# Patient Record
Sex: MALE | Race: OTHER | HISPANIC OR LATINO | Employment: UNEMPLOYED | URBAN - METROPOLITAN AREA
[De-identification: names, ages, dates, MRNs, and addresses within clinical notes are randomized per-mention and may not be internally consistent; named-entity substitution may affect disease eponyms.]

---

## 2019-12-05 DIAGNOSIS — G54.0 TOS (THORACIC OUTLET SYNDROME): Primary | ICD-10-CM

## 2019-12-16 ENCOUNTER — OFFICE VISIT (OUTPATIENT)
Dept: VASCULAR SURGERY | Facility: CLINIC | Age: 34
End: 2019-12-16
Attending: SURGERY
Payer: COMMERCIAL

## 2019-12-16 ENCOUNTER — HOSPITAL ENCOUNTER (OUTPATIENT)
Dept: RADIOLOGY | Facility: HOSPITAL | Age: 34
Discharge: HOME | End: 2019-12-16
Attending: SURGERY
Payer: COMMERCIAL

## 2019-12-16 VITALS
HEART RATE: 84 BPM | RESPIRATION RATE: 20 BRPM | TEMPERATURE: 100 F | DIASTOLIC BLOOD PRESSURE: 85 MMHG | BODY MASS INDEX: 21.29 KG/M2 | HEIGHT: 66 IN | WEIGHT: 132.5 LBS | SYSTOLIC BLOOD PRESSURE: 126 MMHG

## 2019-12-16 DIAGNOSIS — R07.89 PAIN, CHEST WALL: Chronic | ICD-10-CM

## 2019-12-16 DIAGNOSIS — M79.602 LEFT ARM PAIN: ICD-10-CM

## 2019-12-16 DIAGNOSIS — G54.0 TOS (THORACIC OUTLET SYNDROME): ICD-10-CM

## 2019-12-16 PROCEDURE — 99999 PR PBB SHADOW E&M-EST. PATIENT-LVL III: ICD-10-PCS | Mod: PBBFAC,,, | Performed by: SURGERY

## 2019-12-16 PROCEDURE — 99204 OFFICE O/P NEW MOD 45 MIN: CPT | Mod: S$GLB,,, | Performed by: SURGERY

## 2019-12-16 PROCEDURE — 71046 XR CHEST PA AND LATERAL: ICD-10-PCS | Mod: 26,,, | Performed by: RADIOLOGY

## 2019-12-16 PROCEDURE — 99999 PR PBB SHADOW E&M-EST. PATIENT-LVL III: CPT | Mod: PBBFAC,,, | Performed by: SURGERY

## 2019-12-16 PROCEDURE — 99204 PR OFFICE/OUTPT VISIT, NEW, LEVL IV, 45-59 MIN: ICD-10-PCS | Mod: S$GLB,,, | Performed by: SURGERY

## 2019-12-16 PROCEDURE — 71046 X-RAY EXAM CHEST 2 VIEWS: CPT | Mod: 26,,, | Performed by: RADIOLOGY

## 2019-12-16 PROCEDURE — 99213 OFFICE O/P EST LOW 20 MIN: CPT | Mod: PBBFAC,25 | Performed by: SURGERY

## 2019-12-16 PROCEDURE — 71046 X-RAY EXAM CHEST 2 VIEWS: CPT | Mod: TC

## 2019-12-16 NOTE — LETTER
December 17, 2019      Jeferson Larson MD  [address]  [facility] [address]           [facility] - Vascular Surgery  [address]  [facility] [address]  Phone: [phone]  Fax: [phone]          Patient: Mahin Blue   MR Number: [id number]   YOB: 1985   Date of Visit: 12/16/2019       Dear Dr. Jeferson Larson:    Thank you for referring Mahin Blue to me for evaluation. Attached you will find relevant portions of my assessment and plan of care.    If you have questions, please do not hesitate to call me. I look forward to following Mahin Blue along with you.    Sincerely,    NERIS Castellanos II, MD    Enclosure  CC:  No Recipients    If you would like to receive this communication electronically, please contact [email or web address] or [phone] to request more information on [employer] Link access.    For providers and/or their staff who would like to refer a patient to [facility], please contact us through our one-stop-shop provider referral line, [facility], at [phone].    If you feel you have received this communication in error or would no longer like to receive these types of communications, please e-mail [email or web address]

## 2019-12-17 PROBLEM — R07.89 PAIN, CHEST WALL: Chronic | Status: ACTIVE | Noted: 2019-12-17

## 2019-12-17 PROBLEM — M79.602 LEFT ARM PAIN: Status: ACTIVE | Noted: 2019-12-17

## 2019-12-18 NOTE — PROGRESS NOTES
VASCULAR SURGERY NOTE    Patient ID: Mahin Blue is a 34 y.o. male.    I. HISTORY     Chief Complaint: evaluation for neurogenic TOS    HPI: Mahin Blue is a 34 y.o. male who is here today for new patient initial appointment.  The patient was seen with his  who translated for him with his consent.  He sustained an injury to his left chest in 2006 when a large saw that he was working with on the job kicked back and cut him across his chest.  He was taken to the hospital where the large laceration was repaired with sutures. The laceration eventually healed.  Initially he did okay and made a full recovery.  However over past several years he has had persistent pain and hypersensitivity in his left upper chest inferior and lateral to his scar. He also complains of tightness in the left upper chest.  He also has numbness, pain, and paresthesias radiating to his left arm and hand.  The symptoms worsen with left arm activity and left shoulder abduction.  He denies any other medical problems.    He has had extensive workup for these symptoms. He has participated in extensive physical therapy to help loosen the muscles of his left chest without improvement in symptoms.  He was seen by Dr. Matthew Patterson in pain management in September and again in November of this year.  He had trigger point injection in his left chest at the area of maximum tenderness. This did not result in any improvement of his symptoms.  He was referred to Dr. Jeferson Vieira with sports medicine.  He had an MRI of his left shoulder which was normal. He also had an EMG of his left arm and shoulder which was normal. He was referred to me  for evaluation for possible thoracic outlet syndrome.    No past medical history on file.     Past Surgical History:   Procedure Laterality Date    REPAIR OF LACERATION N/A     chest trauma 2006       Social History     Tobacco Use   Smoking Status Never Smoker        Review of Systems   Constitution:  Negative for chills and fever.   HENT: Negative for ear pain and hoarse voice.    Eyes: Negative for discharge and pain.   Cardiovascular: Negative for chest pain and palpitations.   Respiratory: Negative for cough, hemoptysis and shortness of breath.    Endocrine: Negative for polydipsia and polyuria.   Skin: Negative for dry skin and rash.   Musculoskeletal: Negative for back pain and neck pain.   Gastrointestinal: Negative for abdominal pain, diarrhea, nausea and vomiting.   Genitourinary: Negative for dysuria and hematuria.   Neurological: Negative for dizziness, loss of balance and paresthesias.         II. PHYSICAL EXAM     Physical Exam  GEN: Alert/oriented  HEENT: atraumatic, normocephalic  CHEST: normal respiratory effort, symmetrical chest rise  CARD: RRR  ABD: soft, NTND  EXT: Warm, no cyanosis/edema  VASC: 2+ radial pulses bilaterally      Arm Abduction:  Left: + local and peripheral symptoms  Right: negative    Scalene Triangle palpation:  Left:  Negative  Right:  Negative    Pec Minor Insertion palpation:  Left:  Positive local and peripheral symptoms  Right:  Negative    EAST:  Left:  Positive at  1 min 15 sec  Right:  Negative    ULTT:  Left:  Negative  Right:  Negative    QuickDash Score: 57      III. ASSESSMENT & PLAN (MEDICAL DECISION MAKING)     1. Pain, chest wall    2. Left arm pain        Imaging Results:  CXR:  normal    Assessment/Diagnosis and Plan:  34 y.o. 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  We will refer him to Dr. Nichole Healy for left pec minor block for diagnosis of NPMS. Results of block will help predict outcome with pec minor release and strengthen the diagnosis of NPMS.    -ambulatory referral to Dr. Healy for left pec minor block  -RTC on the week following the block to discuss possible surgery    NERIS Castellanos II, MD, VI  Vascular Surgeon  [facility]

## 2020-01-06 ENCOUNTER — TELEPHONE (OUTPATIENT)
Dept: PAIN MEDICINE | Facility: CLINIC | Age: 35
End: 2020-01-06

## 2020-01-06 NOTE — TELEPHONE ENCOUNTER
Spoke with Jayne, advised that Dr. Healy will perform block per referral from Dr. Castellanos. Patient scheduled at [facility] location with Dr. Healy. Also advised that Dr. Healy will not be primary provider on workman's comp case. Voiced understanding.    Authorization created for in-office procedure. Attached to appointment. Faxed to Jayne for approval from .      ----- Message from Alexi Lau sent at 1/6/2020 11:47 AM CST -----  Contact: Jayne Castellanos Case t [phone]  Jayne Castellanos Case Management is calling in reference to a referral for the pt    Please contact Jayne at [phone]

## 2020-01-29 ENCOUNTER — TELEPHONE (OUTPATIENT)
Dept: PAIN MEDICINE | Facility: CLINIC | Age: 35
End: 2020-01-29

## 2020-01-29 NOTE — TELEPHONE ENCOUNTER
Staff contacted the patient to confirm his 1/30/20 8:30 am Consult appointment with Dr. Healy and review IPM. Patient can contact our office at [phone] to reschedule or cancel if needed.    Patient is workman comp. Appear to be pending but is approved as per referral. Approval scanned in media.     Patient verified location date and time and verbalized understanding of IPM but staff do not feel the patient understood as per language barrier. Patient speaks broken english was rushing staff off the phone.     Staff did thank staff.

## 2020-01-30 ENCOUNTER — OFFICE VISIT (OUTPATIENT)
Dept: PAIN MEDICINE | Facility: CLINIC | Age: 35
End: 2020-01-30
Attending: ANESTHESIOLOGY
Payer: OTHER MISCELLANEOUS

## 2020-01-30 VITALS
SYSTOLIC BLOOD PRESSURE: 123 MMHG | HEART RATE: 86 BPM | BODY MASS INDEX: 23.27 KG/M2 | DIASTOLIC BLOOD PRESSURE: 89 MMHG | RESPIRATION RATE: 18 BRPM | WEIGHT: 144.81 LBS | OXYGEN SATURATION: 100 % | TEMPERATURE: 97 F | HEIGHT: 66 IN

## 2020-01-30 DIAGNOSIS — M79.18 MYOFASCIAL PAIN: ICD-10-CM

## 2020-01-30 DIAGNOSIS — G89.4 CHRONIC PAIN DISORDER: Primary | ICD-10-CM

## 2020-01-30 DIAGNOSIS — G54.0 THORACIC OUTLET SYNDROME: ICD-10-CM

## 2020-01-30 DIAGNOSIS — I77.89 PECTORALIS MINOR SYNDROME: ICD-10-CM

## 2020-01-30 PROCEDURE — 76942 PR U/S GUIDANCE FOR NEEDLE GUIDANCE: ICD-10-PCS | Mod: 26,S$PBB,, | Performed by: ANESTHESIOLOGY

## 2020-01-30 PROCEDURE — 76942 ECHO GUIDE FOR BIOPSY: CPT | Mod: 26,S$PBB,, | Performed by: ANESTHESIOLOGY

## 2020-01-30 PROCEDURE — 99204 PR OFFICE/OUTPT VISIT, NEW, LEVL IV, 45-59 MIN: ICD-10-PCS | Mod: S$PBB,,, | Performed by: ANESTHESIOLOGY

## 2020-01-30 PROCEDURE — 20552 NJX 1/MLT TRIGGER POINT 1/2: CPT | Mod: PBBFAC,GC | Performed by: ANESTHESIOLOGY

## 2020-01-30 PROCEDURE — 99999 PR PBB SHADOW E&M-EST. PATIENT-LVL IV: ICD-10-PCS | Mod: PBBFAC,,, | Performed by: ANESTHESIOLOGY

## 2020-01-30 PROCEDURE — 20552 NJX 1/MLT TRIGGER POINT 1/2: CPT | Mod: S$PBB,,, | Performed by: ANESTHESIOLOGY

## 2020-01-30 PROCEDURE — 76942 ECHO GUIDE FOR BIOPSY: CPT | Mod: PBBFAC,GC | Performed by: ANESTHESIOLOGY

## 2020-01-30 PROCEDURE — 99204 OFFICE O/P NEW MOD 45 MIN: CPT | Mod: S$PBB,,, | Performed by: ANESTHESIOLOGY

## 2020-01-30 PROCEDURE — 99999 PR PBB SHADOW E&M-EST. PATIENT-LVL IV: CPT | Mod: PBBFAC,,, | Performed by: ANESTHESIOLOGY

## 2020-01-30 PROCEDURE — 20552 PR INJECT TRIGGER POINT, 1 OR 2: ICD-10-PCS | Mod: S$PBB,,, | Performed by: ANESTHESIOLOGY

## 2020-01-30 RX ORDER — BETAMETHASONE SODIUM PHOSPHATE AND BETAMETHASONE ACETATE 3; 3 MG/ML; MG/ML
6 INJECTION, SUSPENSION INTRA-ARTICULAR; INTRALESIONAL; INTRAMUSCULAR; SOFT TISSUE
Status: COMPLETED | OUTPATIENT
Start: 2020-01-30 | End: 2020-01-30

## 2020-01-30 RX ORDER — BUPIVACAINE HYDROCHLORIDE 5 MG/ML
3 INJECTION, SOLUTION EPIDURAL; INTRACAUDAL
Status: COMPLETED | OUTPATIENT
Start: 2020-01-30 | End: 2020-01-30

## 2020-01-30 RX ORDER — METHOCARBAMOL 500 MG/1
500 TABLET, FILM COATED ORAL 3 TIMES DAILY PRN
Qty: 30 TABLET | Refills: 2 | Status: SHIPPED | OUTPATIENT
Start: 2020-01-30 | End: 2020-02-20

## 2020-01-30 RX ADMIN — BETAMETHASONE ACETATE AND BETAMETHASONE SODIUM PHOSPHATE 6 MG: 3; 3 INJECTION, SUSPENSION INTRA-ARTICULAR; INTRALESIONAL; INTRAMUSCULAR; SOFT TISSUE at 09:01

## 2020-01-30 RX ADMIN — BUPIVACAINE HYDROCHLORIDE 15 MG: 5 INJECTION, SOLUTION EPIDURAL; INTRACAUDAL at 09:01

## 2020-01-30 NOTE — PATIENT INSTRUCTIONS
Recommend starting methocarbamol 500 mg at bedtime for pain as needed for muscle spasm/pain. If tolerated, you may increase this up to 3 times daily.  This medication may make you sleepy, so do not drive until you know how it affect you.  Some people choose to only take this at night.

## 2020-01-30 NOTE — PROGRESS NOTES
Subjective:     Patient ID: Mahin Blue is a 34 y.o. male.    Chief Complaint: Pain    Consulted by: Vascular Surgery    Disclaimer: This note was generated using voice recognition software.  There may be typographical errors that were missed during proofreading.    History from Dr. Castellanos's note:    Mahin Blue is a 34 y.o. male who is here today for new patient initial appointment.  The patient was seen with his  who translated for him with his consent.  He sustained an injury to his left chest in 2006 when a large saw that he was working with on the job kicked back and cut him across his chest.  He was taken to the hospital where the large laceration was repaired with sutures. The laceration eventually healed.  Initially he did okay and made a full recovery.  However over past several years he has had persistent pain and hypersensitivity in his left upper chest inferior and lateral to his scar. He also complains of tightness in the left upper chest.  He also has numbness, pain, and paresthesias radiating to his left arm and hand.  The symptoms worsen with left arm activity and left shoulder abduction.  He denies any other medical problems.     He has had extensive workup for these symptoms. He has participated in extensive physical therapy to help loosen the muscles of his left chest without improvement in symptoms.  He was seen by Dr. Matthew Patterson in pain management in September and again in November of this year.  He had trigger point injection in his left chest at the area of maximum tenderness. This did not result in any improvement of his symptoms.  He was referred to Dr. Jeferson Vieira with sports medicine.  He had an MRI of his left shoulder which was normal. He also had an EMG of his left arm and shoulder which was normal. He was referred to me  for evaluation for possible thoracic outlet syndrome.     HPI:    Mahin Blue is a 34 y.o. male who presents today with chronic neck  pain. This pain started 4yrs ago from above injury.  This pain is described in detail below. Burning hot sensation, burning on upper left chest, constant., chores, repititive movement associated with weakness and numbness and tingling samuels aspect of hand. Previous trigger point injection 4 mos ago. With PT ROM improved as far as weakness, but pain remains as well as numbness    Aggravating factors: repetitive movementtes    Mitigating factors: rest    Previously seeing: dr. Hernandez,     Physical Therapy: over the last 4 years, multiple attempts, started again 4 mos ago, last session 3-4 weeks ago (3x a week) strengthen, rom, stretching, pec minor lengthening.    Non-pharmacologic Treatment:     · Ice/Heat: ice, helps  · TENS:  Yes during physical therapy, no help  · Massage: yes, no help  · Chiropractic care: no  · Acupuncture: no  · Other: no         Pain Medications:         · Currently taking: Tylenol 500mg prn    · Has tried in the past:    · Opioids: tramadol (3mos ago)  · NSAIDS: naproxen  · Tylenol: as above  · Muscle relaxants: no  · TCAs: no  · SNRIs: no  · Anticonvulsants: lyrica   · topical creams: compound, mederma cream, lidocaine patch  · Other: parafon forte    Blood thinners: no    Interventional Therapies: trigger point injection 4mos ago, no  Relevant Surgeries: large laceration repair initially    Affecting sleep? Yes, uninterrupted 4-5hrs    Affecting daily activities? Yes, chores gets arm fatigue    Depressive symptoms? no          · SI/HI? No    Work status: no    Pain Scale:  Best: 3/10  Worst: 6/10  Average: 4/10  Today: 3/10    Prescription Monitoring Program database:  Not applicable    Last 3 PDI Scores 1/30/2020   Pain Disability Index (PDI) 18       GENERAL:  No weight loss, malaise or fevers.  HEENT:   No recent changes in vision or hearing  NECK:  Negative for lumps, no difficulty with swallowing.  RESPIRATORY:  Negative for cough, wheezing or shortness of breath, patient  denies any recent URI.  CARDIOVASCULAR:  Negative for chest pain, leg swelling or palpitations.  GI:  Negative for abdominal discomfort, blood in stools or black stools or change in bowel habits.  MUSCULOSKELETAL:  See HPI.  SKIN:  Negative for lesions, rash, and itching.  PSYCH:  No mood disorder or recent psychosocial stressors.    HEMATOLOGY/LYMPHOLOGY:  Negative for prolonged bleeding, bruising easily or swollen nodes.    ENDO: No history of diabetes or thyroid dysfunction  NEURO:   No history of headaches, syncope, paralysis, seizures or tremors.  All other reviewed and negative other than HPI.          History reviewed. No pertinent past medical history.    Past Surgical History:   Procedure Laterality Date    REPAIR OF LACERATION N/A     chest trauma 2006       Review of patient's allergies indicates:  No Known Allergies    No current outpatient medications on file.     No current facility-administered medications for this visit.        History reviewed. No pertinent family history.    Social History     Socioeconomic History    Marital status:      Spouse name: Not on file    Number of children: Not on file    Years of education: Not on file    Highest education level: Not on file   Occupational History    Not on file   Social Needs    Financial resource strain: Not on file    Food insecurity:     Worry: Not on file     Inability: Not on file    Transportation needs:     Medical: Not on file     Non-medical: Not on file   Tobacco Use    Smoking status: Never Smoker   Substance and Sexual Activity    Alcohol use: Not on file    Drug use: Not on file    Sexual activity: Not on file   Lifestyle    Physical activity:     Days per week: Not on file     Minutes per session: Not on file    Stress: Not on file   Relationships    Social connections:     Talks on phone: Not on file     Gets together: Not on file     Attends Rastafarian service: Not on file     Active member of club or  "organization: Not on file     Attends meetings of clubs or organizations: Not on file     Relationship status: Not on file   Other Topics Concern    Not on file   Social History Narrative    Not on file       Objective:     Vitals:    01/30/20 0826   BP: 123/89   Pulse: 86   Resp: 18   Temp: 97.2 °F (36.2 °C)   SpO2: 100%   Weight: 65.7 kg (144 lb 13.5 oz)   Height: 5' 6" (1.676 m)   PainSc:   3   PainLoc: Chest       GEN:  Well developed, well nourished.  No acute distress.  No pain behavior.  HEENT:  No trauma.  Mucous membranes moist.  Nares patent bilaterally.  PSYCH: Normal affect. Thought content appropriate.  CHEST:  Breathing symmetric.  No audible wheezing.  ABD: Soft, non-distended.  SKIN:  Warm, pink, dry.  No rash on exposed areas.    EXT:  No cyanosis, clubbing, or edema.  No color change or changes in nail or hair growth.  NEURO/MUSCULOSKELETAL:  Fully alert, oriented, and appropriate. Speech normal comfort. No cranial nerve deficits.   Gait: Normal.  5/5 motor strength throughout upper extremities.   Sensory: No sensory deficit in the upper extremities.   Reflexes: 2+ and symmetric throughout.  Negative Huang's bilaterally.  C-Spine:  normal ROM with pain on extension. Negative facet loading bilaterally.  Negative Spurling's bilaterally.    No TTP over cervical facet joints, cervical paraspinal muscles, shoulders, elbows or hands.  TTP left pectoralis entirely, insertion is the most  Adson's test: positive on the left  EAST Test: positive for weakness at 10 secdones  Trujillo's test: positive on the left  Negative scalene testing  Positive pectoralis impingement for pain and peripheral numbness      Imaging:        The imaging studies listed below were independently reviewed by me, and I agree with the findings as documented below.     Narrative     EXAMINATION:  XR CHEST PA AND LATERAL    CLINICAL HISTORY:  Thoracic Outlet Syndrome; Brachial plexus disorders    TECHNIQUE:  PA and lateral views of " the chest were performed.    COMPARISON:  None    FINDINGS:  Lung volumes are normal and symmetric.  Mediastinal structures are midline.  Cardiac silhouette and pulmonary vascular distribution appear within normal limits.  Osseous structures show no significant abnormalities.  No pleural effusion or pneumothorax.      Impression       Unremarkable chest radiograph.    Electronically signed by resident: Karen Vazquez  Date: 12/16/2019  Time: 14:25    Electronically signed by: Sunny Guzman MD  Date: 12/16/2019  Time: 14:30         Assessment:     Encounter Diagnoses   Name Primary?    Chronic pain disorder Yes    Thoracic outlet syndrome     Myofascial pain     Pectoralis minor syndrome        Plan:     Mahin was seen today for chest pain.    Diagnoses and all orders for this visit:    Chronic pain disorder    Thoracic outlet syndrome    Myofascial pain  -     bupivacaine (PF) 0.5% (5 mg/mL) injection 15 mg  -     betamethasone acetate-betamethasone sodium phosphate injection 6 mg  -     methocarbamol (ROBAXIN) 500 MG Tab; Take 1 tablet (500 mg total) by mouth 3 (three) times daily as needed (pain).    Pectoralis minor syndrome         L chest and arm pain is consistent with the above.    We discussed the assessment and recommendations.  All available images were reviewed. We discussed the disease process, prognosis, treatment plan, and risks and benefits. The patient is aware of the risks and benefits of the medications being prescribed, common side effects, and proper usage. The following is the plan we agreed on:     1. Schedule for pectoralis block today. The procedure was explained in detail, including risks, benefits, and alternatives.  All questions answered.  Consent obtained today.  1. If relief, consider Dysport in the future in the area.  2. Trial robaxin 500 mg TID. Stay at most comfortable dose.  Benefits, alternatives, side effects discussed.  All questions answered  3. Consider new referral  to physical therapy, especially if we were to do Dysport  4. Recommend using Tylenol 1000 mg every 8 hours as needed for pain.  Do not take this with any other medications containing acetaminophen.  Do not exceed 3000 mg of acetaminophen in 24 hours.  5. RTC in 2-3 weeks or sooner if needed.    Maribell Dorman MD  PGY-3, CA-2  Pager [phone]    Date of Procedure: 1/30/2020    Procedure: Left Pectoral injection using ultrasound guidance    Pre-op diagnosis: left Pectoral pain    Post-op diagnosis: Same     Physician: Dr. Nichole Healy     Assistant: Dr. Dorman    Anesthestia: local    EBL: None    Specimens: None    All medications, allergies, and relevant histories were reviewed. No recent antibiotics or infections. A time-out was taken to verify the correct patient, procedure, laterality, and appropriate medications/allergies.    Left Pectoralis Minor Injection using US guidance:    Patient was placed in the supine position. left anterior chest wall was prepped with chlorparep. Sterile precautions observed throughout the procedure. Using ultrasound guidance the ribs 3-5 were identified. The pectoralis minor and major were identified.  The course of the pec minor muscle was traced laterally until the tendon was identified. Just distal to the tendon, 4 cc of bupivacaine 0.5% with 6 mg betamethasone was injected into the muscle body with good spread of local anesthesia.     The patient was monitored after the procedure. Patient tolerated the procedure well without any complications.  Patient was discharged in the company of a responsible adult.      Thank you for allowing me to participate in the care of this patient.   Please do not hesitate to call me at [phone] with any questions or concerns.    Nichole Healy MD  01/30/2020     The above plan and management options were discussed at length with patient. Patient is in agreement with the above and verbalized understanding. It will be communicated with the  referring physician via electronic record, fax, or mail.

## 2020-01-30 NOTE — LETTER
January 30, 2020      NERIS Castellanos II, MD  [address] [facility] [address]           [facility] [address] [facility] [address]  [address]  [facility] [address]  Phone: [phone]  Fax: [phone]          Patient: Mahin Blue   MR Number: [id number]   YOB: 1985   Date of Visit: 1/30/2020       Dear Dr. NERIS Castellanos II:    Thank you for referring Mahin Blue to me for evaluation. Attached you will find relevant portions of my assessment and plan of care.    If you have questions, please do not hesitate to call me. I look forward to following Mahin Blue along with you.    Sincerely,    Dee Haddad, MIGUEL    Enclosure  CC:  No Recipients    If you would like to receive this communication electronically, please contact [email or web address] or [phone] to request more information on [employer] Link access.    For providers and/or their staff who would like to refer a patient to [facility], please contact us through our one-stop-shop provider referral line, [facility], at [phone].    If you feel you have received this communication in error or would no longer like to receive these types of communications, please e-mail [email or web address]

## 2020-02-05 ENCOUNTER — TELEPHONE (OUTPATIENT)
Dept: PAIN MEDICINE | Facility: CLINIC | Age: 35
End: 2020-02-05

## 2020-02-05 NOTE — TELEPHONE ENCOUNTER
Staff accepted an incoming call from jayne gordon she states she is the patient's .     Jayne request for the patient's last office visit note be faxed to her.     Our Pain- stated she would have to obtain the records via medical records department as it was not the patient request for the records.     Jayne was given medical records number here at [facility] and she verbalized understanding and expressed thanks.

## 2020-02-05 NOTE — TELEPHONE ENCOUNTER
----- Message from Winsome Choe sent at 2/5/2020  9:05 AM CST -----  Contact: nurse  rick  Patient called to speak with a nurse. Gave no further details.    She would like a callback at [phone]    Thanks  KB

## 2020-02-05 NOTE — TELEPHONE ENCOUNTER
Staff contacted Jayne () regarding patient.    Jayne did not answer therefore staff left a detailed voice message informing the patient of the above information.

## 2020-02-20 ENCOUNTER — OFFICE VISIT (OUTPATIENT)
Dept: PAIN MEDICINE | Facility: CLINIC | Age: 35
End: 2020-02-20
Payer: COMMERCIAL

## 2020-02-20 VITALS
WEIGHT: 144.19 LBS | HEART RATE: 83 BPM | DIASTOLIC BLOOD PRESSURE: 85 MMHG | SYSTOLIC BLOOD PRESSURE: 133 MMHG | RESPIRATION RATE: 18 BRPM | BODY MASS INDEX: 23.17 KG/M2 | HEIGHT: 66 IN | OXYGEN SATURATION: 100 % | TEMPERATURE: 98 F

## 2020-02-20 DIAGNOSIS — I77.89 PECTORALIS MINOR SYNDROME: ICD-10-CM

## 2020-02-20 DIAGNOSIS — G54.0 THORACIC OUTLET SYNDROME: ICD-10-CM

## 2020-02-20 DIAGNOSIS — M79.18 MYOFASCIAL PAIN: ICD-10-CM

## 2020-02-20 DIAGNOSIS — G89.4 CHRONIC PAIN DISORDER: Primary | ICD-10-CM

## 2020-02-20 PROCEDURE — 99213 PR OFFICE/OUTPT VISIT, EST, LEVL III, 20-29 MIN: ICD-10-PCS | Mod: S$GLB,,, | Performed by: NURSE PRACTITIONER

## 2020-02-20 PROCEDURE — 99213 OFFICE O/P EST LOW 20 MIN: CPT | Mod: S$GLB,,, | Performed by: NURSE PRACTITIONER

## 2020-02-20 PROCEDURE — 99999 PR PBB SHADOW E&M-EST. PATIENT-LVL III: CPT | Mod: PBBFAC,,, | Performed by: NURSE PRACTITIONER

## 2020-02-20 PROCEDURE — 99999 PR PBB SHADOW E&M-EST. PATIENT-LVL III: ICD-10-PCS | Mod: PBBFAC,,, | Performed by: NURSE PRACTITIONER

## 2020-02-20 RX ORDER — CYCLOBENZAPRINE HCL 5 MG
5 TABLET ORAL 3 TIMES DAILY PRN
Qty: 30 TABLET | Refills: 1 | Status: SHIPPED | OUTPATIENT
Start: 2020-02-20 | End: 2020-03-01

## 2020-02-20 NOTE — PROGRESS NOTES
Subjective:     Patient ID: Mahin Blue is a 34 y.o. male.    Chief Complaint: Pain    Consulted by: Vascular Surgery    Disclaimer: This note was generated using voice recognition software.  There may be typographical errors that were missed during proofreading.    History from Dr. Castellanos's note:    Mahin Blue is a 34 y.o. male who is here today for new patient initial appointment.  The patient was seen with his  who translated for him with his consent.  He sustained an injury to his left chest in 2006 when a large saw that he was working with on the job kicked back and cut him across his chest.  He was taken to the hospital where the large laceration was repaired with sutures. The laceration eventually healed.  Initially he did okay and made a full recovery.  However over past several years he has had persistent pain and hypersensitivity in his left upper chest inferior and lateral to his scar. He also complains of tightness in the left upper chest.  He also has numbness, pain, and paresthesias radiating to his left arm and hand.  The symptoms worsen with left arm activity and left shoulder abduction.  He denies any other medical problems.     He has had extensive workup for these symptoms. He has participated in extensive physical therapy to help loosen the muscles of his left chest without improvement in symptoms.  He was seen by Dr. Matthew Patterson in pain management in September and again in November of this year.  He had trigger point injection in his left chest at the area of maximum tenderness. This did not result in any improvement of his symptoms.  He was referred to Dr. Jeferson Vieira with sports medicine.  He had an MRI of his left shoulder which was normal. He also had an EMG of his left arm and shoulder which was normal. He was referred to me  for evaluation for possible thoracic outlet syndrome.     HPI:    Mahin Blue is a 34 y.o. male who presents today with chronic neck  pain. This pain started 4yrs ago from above injury.  This pain is described in detail below. Burning hot sensation, burning on upper left chest, constant., chores, repititive movement associated with weakness and numbness and tingling samuels aspect of hand. Previous trigger point injection 4 mos ago. With PT ROM improved as far as weakness, but pain remains as well as numbness    Aggravating factors: repetitive movementtes    Mitigating factors: rest    Previously seeing: dr. Hernandez,     Interval History (2/20/2020):  The patient returns to return to clinic today for neck pain. He is s/p left pectoralis block on 1/30/2020. He reports increased pain the first day after the procedure then returned to baseline after 24 hours. His pain remains the same. He continues to report left sided upper chest pain. He does report weakness and numbness to his hand. He reports no relief with Robaxin.     Physical Therapy: over the last 4 years, multiple attempts, started again 4 mos ago, last session 3-4 weeks ago (3x a week) strengthen, rom, stretching, pec minor lengthening.    Non-pharmacologic Treatment:     · Ice/Heat: ice, helps  · TENS:  Yes during physical therapy, no help  · Massage: yes, no help  · Chiropractic care: no  · Acupuncture: no  · Other: no         Pain Medications:         · Currently taking: Tylenol 500mg prn, Robaxin    · Has tried in the past:    · Opioids: tramadol (3mos ago)  · NSAIDS: naproxen  · Tylenol: as above  · Muscle relaxants: robaxin (no relief)  · TCAs: no  · SNRIs: no  · Anticonvulsants: lyrica   · topical creams: compound, mederma cream, lidocaine patch  · Other: parafon forte    Blood thinners: no    Interventional Therapies: trigger point injection 4mos ago, no  Relevant Surgeries: large laceration repair initially    Affecting sleep? Yes, uninterrupted 4-5hrs    Affecting daily activities? Yes, chores gets arm fatigue    Depressive symptoms? no          · SI/HI? No    Work  status: no    Pain Scale:  Best: 3/10  Worst: 6/10  Average: 4/10  Today: 3/10    Prescription Monitoring Program database:  Not applicable    Last 3 PDI Scores 2/20/2020 1/30/2020   Pain Disability Index (PDI) 6 18       GENERAL:  No weight loss, malaise or fevers.  HEENT:   No recent changes in vision or hearing  NECK:  Negative for lumps, no difficulty with swallowing.  RESPIRATORY:  Negative for cough, wheezing or shortness of breath, patient denies any recent URI.  CARDIOVASCULAR:  Negative for chest pain, leg swelling or palpitations.  GI:  Negative for abdominal discomfort, blood in stools or black stools or change in bowel habits.  MUSCULOSKELETAL:  See HPI.  SKIN:  Negative for lesions, rash, and itching.  PSYCH:  No mood disorder or recent psychosocial stressors.    HEMATOLOGY/LYMPHOLOGY:  Negative for prolonged bleeding, bruising easily or swollen nodes.    ENDO: No history of diabetes or thyroid dysfunction  NEURO:   No history of headaches, syncope, paralysis, seizures or tremors.  All other reviewed and negative other than HPI.          History reviewed. No pertinent past medical history.    Past Surgical History:   Procedure Laterality Date    REPAIR OF LACERATION N/A     chest trauma 2006       Review of patient's allergies indicates:  No Known Allergies    Current Outpatient Medications   Medication Sig Dispense Refill    methocarbamol (ROBAXIN) 500 MG Tab Take 1 tablet (500 mg total) by mouth 3 (three) times daily as needed (pain). 30 tablet 2     No current facility-administered medications for this visit.        History reviewed. No pertinent family history.    Social History     Socioeconomic History    Marital status:      Spouse name: Not on file    Number of children: Not on file    Years of education: Not on file    Highest education level: Not on file   Occupational History    Not on file   Social Needs    Financial resource strain: Not on file    Food insecurity:     Worry:  "Not on file     Inability: Not on file    Transportation needs:     Medical: Not on file     Non-medical: Not on file   Tobacco Use    Smoking status: Never Smoker   Substance and Sexual Activity    Alcohol use: Not on file    Drug use: Not on file    Sexual activity: Not on file   Lifestyle    Physical activity:     Days per week: Not on file     Minutes per session: Not on file    Stress: Not on file   Relationships    Social connections:     Talks on phone: Not on file     Gets together: Not on file     Attends Holiness service: Not on file     Active member of club or organization: Not on file     Attends meetings of clubs or organizations: Not on file     Relationship status: Not on file   Other Topics Concern    Not on file   Social History Narrative    Not on file       Objective:     Vitals:    02/20/20 0803   BP: 133/85   Pulse: 83   Resp: 18   Temp: 98.4 °F (36.9 °C)   SpO2: 100%   Weight: 65.4 kg (144 lb 2.9 oz)   Height: 5' 6" (1.676 m)   PainSc:   3   PainLoc: Shoulder       GEN:  Well developed, well nourished.  No acute distress.  No pain behavior.  HEENT:  No trauma.  Mucous membranes moist.  Nares patent bilaterally.  PSYCH: Normal affect. Thought content appropriate.  CHEST:  Breathing symmetric.  No audible wheezing.  ABD: Soft, non-distended.  SKIN:  Warm, pink, dry.  No rash on exposed areas.    EXT:  No cyanosis, clubbing, or edema.  No color change or changes in nail or hair growth.  NEURO/MUSCULOSKELETAL:  Fully alert, oriented, and appropriate. Speech normal comfort. No cranial nerve deficits.   Gait: Normal.  5/5 motor strength throughout upper extremities.   Sensory: No sensory deficit in the upper extremities.   Reflexes: 2+ and symmetric throughout.  Negative Huang's bilaterally.  C-Spine:  normal ROM with pain on extension. Negative facet loading bilaterally.  Negative Spurling's bilaterally.    No TTP over cervical facet joints, cervical paraspinal muscles, shoulders, " elbows or hands.  TTP left pectoralis entirely, worst at insertion site.   Adson's test: positive on the left  EAST Test: positive for weakness at 10 seconds  Trujillo's test: positive on the left  Negative scalene testing  Positive pectoralis impingement for pain and peripheral numbness      Imaging:        The imaging studies listed below were independently reviewed by me, and I agree with the findings as documented below.     Narrative     EXAMINATION:  XR CHEST PA AND LATERAL    CLINICAL HISTORY:  Thoracic Outlet Syndrome; Brachial plexus disorders    TECHNIQUE:  PA and lateral views of the chest were performed.    COMPARISON:  None    FINDINGS:  Lung volumes are normal and symmetric.  Mediastinal structures are midline.  Cardiac silhouette and pulmonary vascular distribution appear within normal limits.  Osseous structures show no significant abnormalities.  No pleural effusion or pneumothorax.      Impression       Unremarkable chest radiograph.    Electronically signed by resident: Karen Vazquez  Date: 12/16/2019  Time: 14:25    Electronically signed by: Sunny Guzman MD  Date: 12/16/2019  Time: 14:30         Assessment:     Encounter Diagnoses   Name Primary?    Chronic pain disorder Yes    Thoracic outlet syndrome     Pectoralis minor syndrome     Myofascial pain        Plan:     Mahin was seen today for follow-up.    Diagnoses and all orders for this visit:    Chronic pain disorder    Thoracic outlet syndrome    Pectoralis minor syndrome    Myofascial pain  -     cyclobenzaprine (FLEXERIL) 5 MG tablet; Take 1 tablet (5 mg total) by mouth 3 (three) times daily as needed for Muscle spasms.         L chest and arm pain is consistent with the above.    We discussed the assessment and recommendations.  All available images were reviewed. We discussed the disease process, prognosis, treatment plan, and risks and benefits. The patient is aware of the risks and benefits of the medications being prescribed,  common side effects, and proper usage. The following is the plan we agreed on:     1. He is s/p left pectoralis block without relief.   2. We will send him back to Dr. Castellanos to discuss surgical options.   3. Discontinue Robaxin. Trial Flexeril 5 mg TID PRN.   4. Consider new referral to physical therapy.  5. Recommend using Tylenol 1000 mg every 8 hours as needed for pain.  Do not take this with any other medications containing acetaminophen.  Do not exceed 3000 mg of acetaminophen in 24 hours.  6. RTC PRN.     - Dr. Healy was consulted on the patient and agrees with this plan.    The above plan and management options were discussed at length with patient. Patient is in agreement with the above and verbalized understanding.     Alysia Yu NP  02/20/2020

## 2020-03-09 ENCOUNTER — OFFICE VISIT (OUTPATIENT)
Dept: VASCULAR SURGERY | Facility: CLINIC | Age: 35
End: 2020-03-09
Attending: SURGERY
Payer: OTHER MISCELLANEOUS

## 2020-03-09 VITALS
BODY MASS INDEX: 23.03 KG/M2 | HEART RATE: 97 BPM | TEMPERATURE: 99 F | DIASTOLIC BLOOD PRESSURE: 84 MMHG | SYSTOLIC BLOOD PRESSURE: 131 MMHG | HEIGHT: 66 IN | WEIGHT: 143.31 LBS

## 2020-03-09 DIAGNOSIS — G54.0 NEUROGENIC THORACIC OUTLET SYNDROME OF LEFT BRACHIAL PLEXUS: Primary | ICD-10-CM

## 2020-03-09 DIAGNOSIS — M79.602 LEFT ARM PAIN: ICD-10-CM

## 2020-03-09 PROCEDURE — 99214 OFFICE O/P EST MOD 30 MIN: CPT | Mod: S$GLB,,, | Performed by: SURGERY

## 2020-03-09 PROCEDURE — 99999 PR PBB SHADOW E&M-EST. PATIENT-LVL III: ICD-10-PCS | Mod: PBBFAC,,, | Performed by: SURGERY

## 2020-03-09 PROCEDURE — 99214 PR OFFICE/OUTPT VISIT, EST, LEVL IV, 30-39 MIN: ICD-10-PCS | Mod: S$GLB,,, | Performed by: SURGERY

## 2020-03-09 PROCEDURE — 99999 PR PBB SHADOW E&M-EST. PATIENT-LVL III: CPT | Mod: PBBFAC,,, | Performed by: SURGERY

## 2020-03-09 RX ORDER — CYCLOBENZAPRINE HCL 5 MG
5 TABLET ORAL NIGHTLY
COMMUNITY

## 2020-03-09 NOTE — PROGRESS NOTES
VASCULAR SURGERY NOTE    Patient ID: Mahin Blue is a 35 y.o. male.    I. HISTORY     Chief Complaint: evaluation for neurogenic TOS    HPI: Mahin Blue is a 35 y.o. male who is here today for new patient initial appointment.  The patient was seen with his  who translated for him with his consent.  He sustained an injury to his left chest in 2006 when a large saw that he was working with on the job kicked back and cut him across his chest.  He was taken to the hospital where the large laceration was repaired with sutures. The laceration eventually healed.  Initially he did okay and made a full recovery.  However over past several years he has had persistent pain and hypersensitivity in his left upper chest inferior and lateral to his scar. He also complains of tightness in the left upper chest.  He also has numbness, pain, and paresthesias radiating to his left arm and hand.  The symptoms worsen with left arm activity and left shoulder abduction.  He denies any other medical problems.    He has had extensive workup for these symptoms. He has participated in extensive physical therapy to help loosen the muscles of his left chest without improvement in symptoms.  He was seen by Dr. Matthew Patterson in pain management in September and again in November of this year.  He had trigger point injection in his left chest at the area of maximum tenderness. This did not result in any improvement of his symptoms.  He was referred to Dr. Jeferson Vieira with sports medicine.  He had an MRI of his left shoulder which was normal. He also had an EMG of his left arm and shoulder which was normal. He was referred to me  for evaluation for possible thoracic outlet syndrome.    Patient was seen in clinic and was referred to pain management for left pectoralis block. Now back to clinic, unfortunately he had no relief of symptoms with the block, still the same pain, numbness and weakness.     No past medical history on  file.     Past Surgical History:   Procedure Laterality Date    REPAIR OF LACERATION N/A     chest trauma 2006       Social History     Tobacco Use   Smoking Status Never Smoker        Review of Systems   Constitution: Negative for chills and fever.   HENT: Negative for ear pain and hoarse voice.    Eyes: Negative for discharge and pain.   Cardiovascular: Negative for chest pain and palpitations.   Respiratory: Negative for cough, hemoptysis and shortness of breath.    Endocrine: Negative for polydipsia and polyuria.   Skin: Negative for dry skin and rash.   Musculoskeletal: Negative for back pain and neck pain.   Gastrointestinal: Negative for abdominal pain, diarrhea, nausea and vomiting.   Genitourinary: Negative for dysuria and hematuria.   Neurological: Positive for paresthesias. Negative for dizziness and loss of balance.         II. PHYSICAL EXAM     Physical Exam  GEN: Alert/oriented  HEENT: atraumatic, normocephalic  CHEST: normal respiratory effort, symmetrical chest rise, large scar across chest  CARD: RRR  EXT: Warm, no cyanosis/edema, normal muscle mass with no atrophy  VASC: 2+ radial pulses bilaterally  NEURO: RUE: 5/5  strength   LUE: 4/5  strength     Arm Abduction:  Left: + local and peripheral symptoms  Right: negative    Scalene Triangle palpation:  Left:  Negative  Right:  Negative    Pec Minor Insertion palpation:  Left:  Positive local and peripheral symptoms  Right:  Negative    EAST:  Left:  Positive at  1 min 15 sec  Right:  Negative    ULTT:  Left:  Negative  Right:  Negative    QuickDash Score: 57      III. ASSESSMENT & PLAN (MEDICAL DECISION MAKING)     1. Neurogenic thoracic outlet syndrome of left brachial plexus    2. Left arm pain        Imaging Results:  CXR:  normal    Assessment/Diagnosis and Plan:  35 y.o. 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 S/p left pectoralis block without improvement. Discussed diagnosis and treatment options extensively with the patient.  Lack of response to pec minor block portends less improvement with pec minor tenotomy, but does not rule out possible improvement. If scar tissue is contributing to his compression in addition to the muscle/tendon, then he may still improve with pec minor tenotomy with neurolysis. Discussed risks (surgical site infection, bleeding, nerve injury), benefits (improved symptoms), and alternatives (continued observation and PT) with the patient. He expressed full understanding. He would like to discuss with his family and will call back with his decision regarding the surgery.     - Patient will discuss surgical option with family and then call the office.     NERIS Castellanos II, MD, [facility]  Vascular Surgeon  [facility]